# Patient Record
Sex: FEMALE | URBAN - METROPOLITAN AREA
[De-identification: names, ages, dates, MRNs, and addresses within clinical notes are randomized per-mention and may not be internally consistent; named-entity substitution may affect disease eponyms.]

---

## 2021-10-21 ENCOUNTER — APPOINTMENT (RX ONLY)
Dept: URBAN - METROPOLITAN AREA CLINIC 108 | Facility: CLINIC | Age: 76
Setting detail: DERMATOLOGY
End: 2021-10-21

## 2021-10-21 DIAGNOSIS — R21 RASH AND OTHER NONSPECIFIC SKIN ERUPTION: ICD-10-CM | Status: INADEQUATELY CONTROLLED

## 2021-10-21 PROCEDURE — ? BIOPSY BY PUNCH METHOD

## 2021-10-21 PROCEDURE — ? PUNCH BIOPSY FOR TISSUE CULTURE

## 2021-10-21 PROCEDURE — ? ORDER TESTS

## 2021-10-21 PROCEDURE — 11105 PUNCH BX SKIN EA SEP/ADDL: CPT

## 2021-10-21 PROCEDURE — ? ADDITIONAL NOTES

## 2021-10-21 PROCEDURE — 11104 PUNCH BX SKIN SINGLE LESION: CPT

## 2021-10-21 PROCEDURE — ? FULL BODY SKIN EXAM - DECLINED

## 2021-10-21 ASSESSMENT — LOCATION ZONE DERM: LOCATION ZONE: LEG

## 2021-10-21 ASSESSMENT — LOCATION DETAILED DESCRIPTION DERM: LOCATION DETAILED: RIGHT DISTAL PRETIBIAL REGION

## 2021-10-21 ASSESSMENT — LOCATION SIMPLE DESCRIPTION DERM: LOCATION SIMPLE: RIGHT PRETIBIAL REGION

## 2021-10-21 NOTE — PROCEDURE: BIOPSY BY PUNCH METHOD
Path Notes Override (Will Replace All Of The Above Text): -\\nDDx: foreign body granuloma vs atypical mycobacterial infection\\nPertinent history: 2-3 month history of enlarging plaque on the right leg noted soon after w[place] the [place]\\nClinical photograph taken and uploaded to chart.

## 2021-10-21 NOTE — PROCEDURE: PUNCH BIOPSY FOR TISSUE CULTURE
Bill For Surgical Tray: no
Procedure Information: This plan will only bill for the biopsy.  The individual tests must be ordered in the 'Order Test' plan.  Bacterial Tissue Cultures: 20474-3.  Fungal Tissue Cultures:  578-5.  Mycobacterium Tissue Cultures: 540-5.
Consent: Written consent was obtained and risks were reviewed including but not limited to scarring, infection, bleeding, scabbing, nerve damage and allergy to anesthesia.
Home Suture Removal Text: Patient was provided a home suture removal kit and will remove their sutures at home.  If they have any questions or difficulties they will call the office.
Additional Anesthesia Volume In Cc (Will Not Render If 0): 0
Detail Level: Detailed
Wound Care: Petrolatum
Dressing: bandage
Punch Size In Mm: 4
Epidermal Sutures: 4-0 Prolene
Suture Removal: 14 days
Post-Care Instructions: I reviewed with the patient in detail post-care instructions. Patient is to keep the biopsy site dry overnight, and then apply bacitracin twice daily until healed. Patient may apply hydrogen peroxide soaks to remove any crusting.
Notification Instructions: Patient will be notified of culture results when they are available. Patient was informed that tissue cultures can take some time to complete and that we will inform them when we get a result.
Anesthesia Type: 1% lidocaine with epinephrine
Hemostasis: None

## 2021-10-21 NOTE — PROCEDURE: ORDER TESTS
Performing Laboratory: [id number]
Billing Type: Third-Party Bill
Bill For Surgical Tray: no
Expected Date Of Service: 10/21/2021

## 2021-10-29 ENCOUNTER — RX ONLY (OUTPATIENT)
Age: 76
Setting detail: RX ONLY
End: 2021-10-29

## 2021-10-29 ENCOUNTER — APPOINTMENT (RX ONLY)
Dept: URBAN - METROPOLITAN AREA CLINIC 108 | Facility: CLINIC | Age: 76
Setting detail: DERMATOLOGY
End: 2021-10-29

## 2021-10-29 DIAGNOSIS — Z02.9 ENCOUNTER FOR ADMINISTRATIVE EXAMINATIONS, UNSPECIFIED: ICD-10-CM

## 2021-10-29 DIAGNOSIS — L30.8 OTHER SPECIFIED DERMATITIS: ICD-10-CM

## 2021-10-29 PROCEDURE — ? ADDITIONAL NOTES

## 2021-10-29 PROCEDURE — ? ORDER TESTS

## 2021-10-29 RX ORDER — DOXYCYCLINE HYCLATE 100 MG/1
TABLET, COATED ORAL
Qty: 28 | Refills: 0 | Status: CANCELLED

## 2021-10-29 RX ORDER — DOXYCYCLINE 100 MG/1
CAPSULE ORAL
Qty: 28 | Refills: 0 | Status: ERX | COMMUNITY
Start: 2021-10-29

## 2021-10-29 NOTE — PROCEDURE: ORDER TESTS
Performing Laboratory: -[id number]
Billing Type: Third-Party Bill
Expected Date Of Service: 10/29/2021
Lab Facility: [id number]
Bill For Surgical Tray: no

## 2021-10-29 NOTE — PROCEDURE: ADDITIONAL NOTES
Render Risk Assessment In Note?: yes
Additional Notes: Punch biopsy H&E results (relayed to the patient over the phone on 10/29/21):\\n\\n1. Suppurative and Granulomatous Dermatitis on right distal pretibial region.\\n\\nPath report describes possible vasculitic process of small to medium vessel and raises possibility of vasculitis - will obtain further w/u with CBC w/ diff, Bun/Cr, LFTs, ESR given report however, clinically this does not appear to be a primary vasculitis.\\n\\nPath reports also raises possibility of infectious etiology - tissue culture is currently pending; was performed at the time of initial visit.\\n\\nPer path report, cannot entirely exclude suppurative granulomatous dermatitis due to am abscess or ruptured cyst - patient will be returning to clinic on Monday for suture removal; will reassess and consider I&D if clinically indicated.\\n\\nLastly, pt reports redness has worsened since her last visit and site feels more firm - will send Rx for doxycycline 100 mg PO BID empirically while awaiting culture results; side effects reviewed with the patient. \\n\\nPt to RTC on Monday as scheduled for f/u. She is aware that if condition worsens over the weekend to seek medical care at urgent care or ER. She is agreeable to this plan.
Detail Level: Detailed

## 2021-11-01 ENCOUNTER — APPOINTMENT (RX ONLY)
Dept: URBAN - METROPOLITAN AREA CLINIC 108 | Facility: CLINIC | Age: 76
Setting detail: DERMATOLOGY
End: 2021-11-01

## 2021-11-01 DIAGNOSIS — L30.8 OTHER SPECIFIED DERMATITIS: ICD-10-CM | Status: INADEQUATELY CONTROLLED

## 2021-11-01 PROCEDURE — 99214 OFFICE O/P EST MOD 30 MIN: CPT

## 2021-11-01 PROCEDURE — ? ADDITIONAL NOTES

## 2021-11-01 PROCEDURE — ? FULL BODY SKIN EXAM - DECLINED

## 2021-11-01 PROCEDURE — ? PRESCRIPTION MEDICATION MANAGEMENT

## 2021-11-01 PROCEDURE — ? ORDER TESTS

## 2021-11-01 PROCEDURE — ? COUNSELING

## 2021-11-01 ASSESSMENT — LOCATION SIMPLE DESCRIPTION DERM: LOCATION SIMPLE: RIGHT PRETIBIAL REGION

## 2021-11-01 ASSESSMENT — LOCATION DETAILED DESCRIPTION DERM: LOCATION DETAILED: RIGHT DISTAL PRETIBIAL REGION

## 2021-11-01 ASSESSMENT — LOCATION ZONE DERM: LOCATION ZONE: LEG

## 2021-11-01 NOTE — PROCEDURE: COUNSELING
Detail Level: Detailed
Patient Specific Counseling (Will Not Stick From Patient To Patient): See above for work up and A/P

## 2021-11-01 NOTE — PROCEDURE: PRESCRIPTION MEDICATION MANAGEMENT
Plan: Continue doxycycline 100mg by mouth twice daily for 2 weeks.
Detail Level: Zone
Render In Strict Bullet Format?: No

## 2021-11-01 NOTE — PROCEDURE: ADDITIONAL NOTES
Detail Level: Simple
Additional Notes: Patient consent was obtained to proceed with the visit and recommended plan of care after discussion of all risks and benefits, including the risks of COVID-19 exposure.
Render Risk Assessment In Note?: yes
Additional Notes: Suppurative and Granulomatous Dermatitis on right distal pretibial region.\\n- S/p punch biopsy x2 for H&E and tissue culture on 10/21/21. \\n- Path report describes possible vasculitic process of small to medium vessel and raises possibility of vasculitis - will obtain further w/u with CBC w/ diff, Bun/Cr, LFTs, ESR given report however, clinically this does not appear to be a primary vasculitis\\n- Path reports also raises possibility of infectious etiology - tissue cultures pending\\n- Per path report, cannot entirely exclude suppurative granulomatous dermatitis due to an abscess or ruptured cyst. On exam, today 11/1/21, the affected area is indurated and without fluctuance. Do not favor I&D at this time.\\n- Current treatment: pt was started on doxycycline 100 mg PO BID on Friday 10/29/21 as she called to inform of worsening redness around original site; since starting the abx, she believes there has been a subtle improvement in the redness. Will continue for a full 2 week course. \\n- The pt will RTC in 1 week to reassess, continue doxycycline 100 mg PO BID, continue wound care with Aquaphor or vaseline; treatment will be guided by results of cultures; if cultures negative, will consider steroid tx\\n- F/u labs above\\n- Sutures removed today\\n
Detail Level: Detailed

## 2021-11-08 ENCOUNTER — APPOINTMENT (RX ONLY)
Dept: URBAN - METROPOLITAN AREA CLINIC 108 | Facility: CLINIC | Age: 76
Setting detail: DERMATOLOGY
End: 2021-11-08

## 2021-11-08 DIAGNOSIS — L30.8 OTHER SPECIFIED DERMATITIS: ICD-10-CM | Status: INADEQUATELY CONTROLLED

## 2021-11-08 PROCEDURE — ? COUNSELING

## 2021-11-08 PROCEDURE — 99214 OFFICE O/P EST MOD 30 MIN: CPT

## 2021-11-08 PROCEDURE — ? ADDITIONAL NOTES

## 2021-11-08 PROCEDURE — ? PRESCRIPTION MEDICATION MANAGEMENT

## 2021-11-08 PROCEDURE — ? FULL BODY SKIN EXAM - DECLINED

## 2021-11-08 ASSESSMENT — LOCATION ZONE DERM: LOCATION ZONE: LEG

## 2021-11-08 ASSESSMENT — LOCATION DETAILED DESCRIPTION DERM: LOCATION DETAILED: RIGHT DISTAL PRETIBIAL REGION

## 2021-11-08 ASSESSMENT — LOCATION SIMPLE DESCRIPTION DERM: LOCATION SIMPLE: RIGHT PRETIBIAL REGION

## 2021-11-08 NOTE — PROCEDURE: PRESCRIPTION MEDICATION MANAGEMENT
Plan: Continue doxycycline 100mg by mouth twice to complete 2 week course
Detail Level: Zone
Render In Strict Bullet Format?: No

## 2021-11-08 NOTE — PROCEDURE: ADDITIONAL NOTES
Detail Level: Simple
Additional Notes: Patient consent was obtained to proceed with the visit and recommended plan of care after discussion of all risks and benefits, including the risks of COVID-19 exposure.
Render Risk Assessment In Note?: yes
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 \\n- S/p punch biopsy x2 for H&E and tissue culture on 10/21/21. \\n- Path report describes possible vasculitic process of small to medium vessel and raises possibility of vasculitis - further w/u with CBC w/ diff, Bun/Cr, LFTs, ESR unremarkable. Discussed lab results with pt.  Clinically this does not appear to be a primary vasculitis\\n- Path reports also raises possibility of infectious etiology - tissue cultures pending, bacterial cx negative, fungal cx NGTD, mycobacterial culture pending.\\n- Per path report, cannot entirely exclude suppurative granulomatous dermatitis due to an abscess or ruptured cyst. On exam, today 11/8/21, the affected area is indurated but without fluctuance. Do not favor I&D at this time.\\n-Continue current treatment: finish course of doxycycline 100 mg PO BID (started on Friday 10/29/21 as she called to inform of worsening redness around original site); since starting the abx, she believes there has been a mild improvement in the redness. Will finish course.\\n- The pt will RTC in 1 week to reassess, continue wound care with Aquaphor or vaseline; treatment will be guided by results of cultures; if cultures negative, will consider ILK
Detail Level: Detailed

## 2021-11-15 ENCOUNTER — RX ONLY (OUTPATIENT)
Age: 76
Setting detail: RX ONLY
End: 2021-11-15

## 2021-11-15 ENCOUNTER — APPOINTMENT (RX ONLY)
Dept: URBAN - METROPOLITAN AREA CLINIC 108 | Facility: CLINIC | Age: 76
Setting detail: DERMATOLOGY
End: 2021-11-15

## 2021-11-15 DIAGNOSIS — S31000A OPEN WOUND(S) (MULTIPLE) OF UNSPECIFIED SITE(S), WITHOUT MENTION OF COMPLICATION: ICD-10-CM | Status: INADEQUATELY CONTROLLED

## 2021-11-15 DIAGNOSIS — L30.8 OTHER SPECIFIED DERMATITIS: ICD-10-CM | Status: RESOLVING

## 2021-11-15 PROBLEM — S81.801A UNSPECIFIED OPEN WOUND, RIGHT LOWER LEG, INITIAL ENCOUNTER: Status: ACTIVE | Noted: 2021-11-15

## 2021-11-15 PROCEDURE — ? PRESCRIPTION MEDICATION MANAGEMENT

## 2021-11-15 PROCEDURE — ? PUNCH BIOPSY FOR TISSUE CULTURE

## 2021-11-15 PROCEDURE — ? ADDITIONAL NOTES

## 2021-11-15 PROCEDURE — ? ORDER TESTS

## 2021-11-15 PROCEDURE — ? COUNSELING

## 2021-11-15 PROCEDURE — 99214 OFFICE O/P EST MOD 30 MIN: CPT | Mod: 25

## 2021-11-15 PROCEDURE — 11104 PUNCH BX SKIN SINGLE LESION: CPT

## 2021-11-15 PROCEDURE — ? FULL BODY SKIN EXAM - DECLINED

## 2021-11-15 RX ORDER — DOXYCYCLINE 100 MG/1
CAPSULE ORAL
Qty: 28 | Refills: 0 | Status: ERX

## 2021-11-15 ASSESSMENT — LOCATION SIMPLE DESCRIPTION DERM: LOCATION SIMPLE: RIGHT PRETIBIAL REGION

## 2021-11-15 ASSESSMENT — LOCATION DETAILED DESCRIPTION DERM: LOCATION DETAILED: RIGHT DISTAL PRETIBIAL REGION

## 2021-11-15 ASSESSMENT — LOCATION ZONE DERM: LOCATION ZONE: LEG

## 2021-11-15 NOTE — PROCEDURE: PUNCH BIOPSY FOR TISSUE CULTURE
Procedure Information: This plan will only bill for the biopsy.  The individual tests must be ordered in the 'Order Test' plan.  Bacterial Tissue Cultures: 20474-3.  Fungal Tissue Cultures:  578-5.  Mycobacterium Tissue Cultures: 540-5.
Detail Level: Detailed
Dressing: bandage
Post-Care Instructions: I reviewed with the patient in detail post-care instructions. Patient is to keep the biopsy site dry overnight, and then apply bacitracin twice daily until healed. Patient may apply hydrogen peroxide soaks to remove any crusting.
Hemostasis: None
Notification Instructions: Patient will be notified of culture results when they are available. Patient was informed that tissue cultures can take some time to complete and that we will inform them when we get a result.
Render Post-Care Instructions In Note?: no
X Size Of Lesion In Cm (Optional): 0
Anesthesia Type: 1% lidocaine with epinephrine
Punch Size In Mm: 4
Home Suture Removal Text: Patient was provided a home suture removal kit and will remove their sutures at home.  If they have any questions or difficulties they will call the office.
Wound Care: Petrolatum
Epidermal Sutures: none, closed by secondary intention
Consent: Written consent was obtained and risks were reviewed including but not limited to scarring, infection, bleeding, scabbing, nerve damage and allergy to anesthesia.

## 2021-11-15 NOTE — PROCEDURE: PRESCRIPTION MEDICATION MANAGEMENT
Plan: Extend doxycycline 100mg by mouth twice for another 2 weeks
Detail Level: Zone
Render In Strict Bullet Format?: No

## 2021-11-15 NOTE — PROCEDURE: ADDITIONAL NOTES
Additional Notes: Repeating punch biopsy for mycobacterial culture today, 11/15/21, as I received a report from [facility] indicating that the initial mycobacterial culture was not performed because the specimen was received at room temperature. I spoke with Dr. Alisha Avila, Medical Director of [facility], to inquire further given that specimen was sent refrigerated as directed. I also requested that the specimen be run despite this. \\n\\nI relayed this to Ms. Fernando today and we had an extensive discussion about how the results may be affected given [facility]'s delay in processing the specimen. I discussed the option of repeat punch biopsy, specifically for mycobacterial culture vs awaiting result of delayed mycobacteria culture from original bx. Pt and I agree that proceeding with a new specimen to r/o mycobacteria infection would be in the best interest of the pt. We discussed that, if tissue culture for mycobacteria is negative, we can proceed with ILK injection. \\n\\nPlan:\\n- Punch bx for mycobacterial culture today\\n- Extend doxycycline 100  mg PO BID (pt reports mild improvement after a 2 week course); notably, we discussed the anti-inflammatory of doxycycline, the pt has been tolerating well. \\n- Continue Aquaphor\\n- RTC 2 weeks for suture removal\\n Additional Notes: Repeating punch biopsy for mycobacterial culture today, 11/15/21, as I received a report from [facility] indicating that the initial mycobacterial culture was not performed because the specimen was received at room temperature. I spoke with Dr. Alisha Avila, Medical Director of [facility], to inquire further given that specimen was sent refrigerated as directed. I also requested that the specimen be run despite this. \\n\\nI relayed this to Ms. Fernando today and we had an extensive discussion about how the results may be affected given [facility]'s delay in processing the specimen. I discussed the option of repeat punch biopsy, specifically for mycobacterial culture vs awaiting result of delayed mycobacteria culture from original bx. Pt and I agree that proceeding with a new specimen to r/o mycobacteria infection would be in the best interest of the pt. We discussed that, if tissue culture for mycobacteria is negative, we can proceed with ILK injection. \\n\\nPlan:\\n- Punch bx for mycobacterial culture today\\n- Extend doxycycline 100  mg PO BID (pt reports mild improvement after a 2 week course); notably, we discussed the anti-inflammatory of doxycycline, the pt has been tolerating well. \\n- Continue Aquaphor\\n- RTC 2 weeks for suture removal\\n

## 2021-11-15 NOTE — PROCEDURE: ORDER TESTS
Billing Type: Third-Party Bill
Performing Laboratory: -[id number]
Expected Date Of Service: 11/15/2021
Bill For Surgical Tray: no

## 2021-11-15 NOTE — PROCEDURE: ADDITIONAL NOTES
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 \\n- S/p punch biopsy x2 for H&E and tissue culture on 10/21/21. \\n- Path report describes possible vasculitic process of small to medium vessel and raises possibility of vasculitis - further w/u with CBC w/ diff, Bun/Cr, LFTs, ESR unremarkable. Discussed lab results with pt.  Clinically this does not appear to be a primary vasculitis\\n- Path reports also raises possibility of infectious etiology - tissue cultures pending, bacterial cx negative, fungal cx NGTD, mycobacterial culture pending (though this was significantly delayed, see discussion above)\\n- Per path report, cannot entirely exclude suppurative granulomatous dermatitis due to an abscess or ruptured cyst. \\n- Plan as above

## 2021-11-29 ENCOUNTER — APPOINTMENT (RX ONLY)
Dept: URBAN - METROPOLITAN AREA CLINIC 108 | Facility: CLINIC | Age: 76
Setting detail: DERMATOLOGY
End: 2021-11-29

## 2021-11-29 DIAGNOSIS — S31000A OPEN WOUND(S) (MULTIPLE) OF UNSPECIFIED SITE(S), WITHOUT MENTION OF COMPLICATION: ICD-10-CM | Status: INADEQUATELY CONTROLLED

## 2021-11-29 DIAGNOSIS — L30.8 OTHER SPECIFIED DERMATITIS: ICD-10-CM | Status: INADEQUATELY CONTROLLED

## 2021-11-29 PROBLEM — S81.801A UNSPECIFIED OPEN WOUND, RIGHT LOWER LEG, INITIAL ENCOUNTER: Status: ACTIVE | Noted: 2021-11-29

## 2021-11-29 PROCEDURE — 99213 OFFICE O/P EST LOW 20 MIN: CPT

## 2021-11-29 PROCEDURE — ? ADDITIONAL NOTES

## 2021-11-29 PROCEDURE — ? TREATMENT REGIMEN

## 2021-11-29 PROCEDURE — ? FULL BODY SKIN EXAM - DECLINED

## 2021-11-29 PROCEDURE — ? COUNSELING

## 2021-11-29 ASSESSMENT — LOCATION DETAILED DESCRIPTION DERM: LOCATION DETAILED: RIGHT DISTAL PRETIBIAL REGION

## 2021-11-29 ASSESSMENT — LOCATION ZONE DERM: LOCATION ZONE: LEG

## 2021-11-29 ASSESSMENT — LOCATION SIMPLE DESCRIPTION DERM: LOCATION SIMPLE: RIGHT PRETIBIAL REGION

## 2021-11-29 NOTE — PROCEDURE: ADDITIONAL NOTES
Additional Notes: S/p repeat punch biopsy for mycobacterial culture on 11/15/21 as I received a report from [facility] indicating that the initial mycobacterial culture was not performed because the specimen was received at room temperature. I spoke with Dr. Alisha Avila, Medical Director of [facility], to inquire further given that specimen was sent refrigerated as directed. I also requested that the specimen be run despite this. \\n\\nI relayed this to MsMarty Abdullahi and we had an extensive discussion about how the results may be affected given [facility]'s delay in processing the specimen. I discussed the option of repeat punch biopsy, specifically for mycobacterial culture vs awaiting result of delayed mycobacteria culture from original bx. Pt and I agree that proceeding with a new specimen to r/o mycobacteria infection would be in the best interest of the pt. We discussed that, if tissue culture for mycobacteria is negative, we can proceed with ILK injection. \\n\\nPlan:\\n\\n\\n- Continue Aquaphor\\n Additional Notes: S/p repeat punch biopsy for mycobacterial culture on 11/15/21 as I received a report from [facility] indicating that the initial mycobacterial culture was not performed because the specimen was received at room temperature. I spoke with Dr. Alisha Avila, Medical Director of [facility], to inquire further given that specimen was sent refrigerated as directed. I also requested that the specimen be run despite this. \\n\\nI relayed this to MsMarty Abdullahi and we had an extensive discussion about how the results may be affected given [facility]'s delay in processing the specimen. I discussed the option of repeat punch biopsy, specifically for mycobacterial culture vs awaiting result of delayed mycobacteria culture from original bx. Pt and I agree that proceeding with a new specimen to r/o mycobacteria infection would be in the best interest of the pt. We discussed that, if tissue culture for mycobacteria is negative, we can proceed with ILK injection. \\n\\nPlan:\\n\\n\\n- Continue Aquaphor\\n

## 2021-11-29 NOTE — PROCEDURE: ADDITIONAL NOTES
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 \\n- S/p punch biopsy x2 for H&E and tissue culture on 10/21/21\\n- S/p repeat punch biopsy for mycobacterial culture on 11/15/21 - NGTD, final still pending\\n- Path report describes possible vasculitic process of small to medium vessel and raises possibility of vasculitis - further w/u with CBC w/ diff, Bun/Cr, LFTs, ESR unremarkable. Discussed lab results with pt.  Clinically this does not appear to be a primary vasculitis\\n- Path report also raises possibility of infectious etiology - bacterial cx negative, fungal cx NGTD, mycobacterial culture pending (see discussion below)\\n- Per path report, cannot entirely exclude suppurative granulomatous dermatitis due to an abscess or ruptured cyst. \\n

## 2021-11-29 NOTE — PROCEDURE: COUNSELING
Detail Level: Detailed
Patient Specific Counseling (Will Not Stick From Patient To Patient): See above for work up and A/P
Patient Specific Counseling (Will Not Stick From Patient To Patient): Small cutaneous ulcerations are sites of two initial punch biopsy sites; there appears to be fibrin at base of both sites with healed edges.

## 2021-11-29 NOTE — PROCEDURE: TREATMENT REGIMEN
Plan: Ms. Fernando is frustrated and anxious given the length of time needed to await final results of tissue cultures. We have had multiple detailed discussions about the workup being performed and I reiterated to her that, prior to considering other treatment options such as ILK, I would like to confirm that tissue cultures are definitively negative. She is requesting that she be called on a weekly basis to be updated about ongoing results of mycobacterial culture. I informed her that either myself, or my medical assistant, will call her to provide this information. \\n\\nShe is also concerned as the initial two biopsy sites on the right shin have not healed completely. She raises concern that they are bigger than the initial biopsies. I lightly curetted both sites today after cleansing the sites with alcohol prep pads to remove fibrin at the wound base and promote further wound healing. \\n\\nGiven the pt's concerns, I discussed referral to a wound care specialist. The pt is agreeable to wound care referral. Notably, she is unable to drive to [place] and prefers to see a specialist around this area.
Detail Level: Generalized